# Patient Record
Sex: FEMALE | Race: WHITE | URBAN - METROPOLITAN AREA
[De-identification: names, ages, dates, MRNs, and addresses within clinical notes are randomized per-mention and may not be internally consistent; named-entity substitution may affect disease eponyms.]

---

## 2018-05-18 ENCOUNTER — ANESTHESIA (OUTPATIENT)
Dept: SURGERY | Facility: CLINIC | Age: 38
End: 2018-05-18
Payer: COMMERCIAL

## 2018-05-18 ENCOUNTER — ANESTHESIA EVENT (OUTPATIENT)
Dept: SURGERY | Facility: CLINIC | Age: 38
End: 2018-05-18
Payer: COMMERCIAL

## 2018-05-18 ENCOUNTER — APPOINTMENT (OUTPATIENT)
Dept: GENERAL RADIOLOGY | Facility: CLINIC | Age: 38
End: 2018-05-18
Attending: ORTHOPAEDIC SURGERY
Payer: COMMERCIAL

## 2018-05-18 ENCOUNTER — HOSPITAL ENCOUNTER (OUTPATIENT)
Facility: CLINIC | Age: 38
Discharge: HOME | End: 2018-05-18
Attending: ORTHOPAEDIC SURGERY | Admitting: ORTHOPAEDIC SURGERY
Payer: COMMERCIAL

## 2018-05-18 VITALS
HEIGHT: 68 IN | RESPIRATION RATE: 16 BRPM | BODY MASS INDEX: 44.41 KG/M2 | TEMPERATURE: 99.2 F | WEIGHT: 293 LBS | SYSTOLIC BLOOD PRESSURE: 126 MMHG | OXYGEN SATURATION: 98 % | DIASTOLIC BLOOD PRESSURE: 75 MMHG

## 2018-05-18 DIAGNOSIS — M84.374A STRESS FRACTURE OF NAVICULAR BONE OF RIGHT FOOT: Primary | ICD-10-CM

## 2018-05-18 LAB — HCG UR QL: NEGATIVE

## 2018-05-18 PROCEDURE — 25000132 ZZH RX MED GY IP 250 OP 250 PS 637: Performed by: ORTHOPAEDIC SURGERY

## 2018-05-18 PROCEDURE — 25000128 H RX IP 250 OP 636: Performed by: PHYSICIAN ASSISTANT

## 2018-05-18 PROCEDURE — 27210794 ZZH OR GENERAL SUPPLY STERILE: Performed by: ORTHOPAEDIC SURGERY

## 2018-05-18 PROCEDURE — 27211024 ZZHC OR SUPPLY OTHER OPNP: Performed by: ORTHOPAEDIC SURGERY

## 2018-05-18 PROCEDURE — 71000027 ZZH RECOVERY PHASE 2 EACH 15 MINS: Performed by: ORTHOPAEDIC SURGERY

## 2018-05-18 PROCEDURE — 25000128 H RX IP 250 OP 636: Performed by: NURSE ANESTHETIST, CERTIFIED REGISTERED

## 2018-05-18 PROCEDURE — 25000566 ZZH SEVOFLURANE, EA 15 MIN: Performed by: ORTHOPAEDIC SURGERY

## 2018-05-18 PROCEDURE — 27210995 ZZH RX 272: Performed by: ORTHOPAEDIC SURGERY

## 2018-05-18 PROCEDURE — 37000009 ZZH ANESTHESIA TECHNICAL FEE, EACH ADDTL 15 MIN: Performed by: ORTHOPAEDIC SURGERY

## 2018-05-18 PROCEDURE — 36000060 ZZH SURGERY LEVEL 3 W FLUORO 1ST 30 MIN: Performed by: ORTHOPAEDIC SURGERY

## 2018-05-18 PROCEDURE — 25000128 H RX IP 250 OP 636: Performed by: ANESTHESIOLOGY

## 2018-05-18 PROCEDURE — 40000306 ZZH STATISTIC PRE PROC ASSESS II: Performed by: ORTHOPAEDIC SURGERY

## 2018-05-18 PROCEDURE — 25000125 ZZHC RX 250: Performed by: NURSE ANESTHETIST, CERTIFIED REGISTERED

## 2018-05-18 PROCEDURE — C1713 ANCHOR/SCREW BN/BN,TIS/BN: HCPCS | Performed by: ORTHOPAEDIC SURGERY

## 2018-05-18 PROCEDURE — C1762 CONN TISS, HUMAN(INC FASCIA): HCPCS | Performed by: ORTHOPAEDIC SURGERY

## 2018-05-18 PROCEDURE — 71000012 ZZH RECOVERY PHASE 1 LEVEL 1 FIRST HR: Performed by: ORTHOPAEDIC SURGERY

## 2018-05-18 PROCEDURE — 93010 ELECTROCARDIOGRAM REPORT: CPT | Performed by: INTERNAL MEDICINE

## 2018-05-18 PROCEDURE — 40000277 XR SURGERY CARM FLUORO LESS THAN 5 MIN W STILLS

## 2018-05-18 PROCEDURE — 71000013 ZZH RECOVERY PHASE 1 LEVEL 1 EA ADDTL HR: Performed by: ORTHOPAEDIC SURGERY

## 2018-05-18 PROCEDURE — 36000058 ZZH SURGERY LEVEL 3 EA 15 ADDTL MIN: Performed by: ORTHOPAEDIC SURGERY

## 2018-05-18 PROCEDURE — 37000008 ZZH ANESTHESIA TECHNICAL FEE, 1ST 30 MIN: Performed by: ORTHOPAEDIC SURGERY

## 2018-05-18 PROCEDURE — 81025 URINE PREGNANCY TEST: CPT | Performed by: ANESTHESIOLOGY

## 2018-05-18 DEVICE — GRAFT BONE PUTTY DBX 01ML 038010: Type: IMPLANTABLE DEVICE | Site: FOOT | Status: FUNCTIONAL

## 2018-05-18 RX ORDER — GABAPENTIN 300 MG/1
300 CAPSULE ORAL 3 TIMES DAILY
Qty: 9 CAPSULE | Refills: 0 | Status: SHIPPED | OUTPATIENT
Start: 2018-05-18 | End: 2018-05-21

## 2018-05-18 RX ORDER — HYDROXYZINE HYDROCHLORIDE 25 MG/1
25 TABLET, FILM COATED ORAL
Status: DISCONTINUED | OUTPATIENT
Start: 2018-05-18 | End: 2018-05-18 | Stop reason: HOSPADM

## 2018-05-18 RX ORDER — HYDROXYZINE HYDROCHLORIDE 25 MG/1
25 TABLET, FILM COATED ORAL EVERY 6 HOURS PRN
Qty: 30 TABLET | Refills: 0 | Status: SHIPPED | OUTPATIENT
Start: 2018-05-18

## 2018-05-18 RX ORDER — IBUPROFEN 600 MG/1
600 TABLET, FILM COATED ORAL EVERY 6 HOURS
Qty: 40 TABLET | Refills: 0 | Status: SHIPPED | OUTPATIENT
Start: 2018-05-18

## 2018-05-18 RX ORDER — CEFAZOLIN SODIUM 1 G/50ML
3 SOLUTION INTRAVENOUS
Status: COMPLETED | OUTPATIENT
Start: 2018-05-18 | End: 2018-05-18

## 2018-05-18 RX ORDER — ONDANSETRON 2 MG/ML
INJECTION INTRAMUSCULAR; INTRAVENOUS PRN
Status: DISCONTINUED | OUTPATIENT
Start: 2018-05-18 | End: 2018-05-18

## 2018-05-18 RX ORDER — OXYCODONE HYDROCHLORIDE 5 MG/1
5-10 TABLET ORAL
Qty: 12 TABLET | Refills: 0 | Status: SHIPPED | OUTPATIENT
Start: 2018-05-18

## 2018-05-18 RX ORDER — DEXAMETHASONE SODIUM PHOSPHATE 4 MG/ML
INJECTION, SOLUTION INTRA-ARTICULAR; INTRALESIONAL; INTRAMUSCULAR; INTRAVENOUS; SOFT TISSUE PRN
Status: DISCONTINUED | OUTPATIENT
Start: 2018-05-18 | End: 2018-05-18

## 2018-05-18 RX ORDER — OXYCODONE HYDROCHLORIDE 5 MG/1
10 TABLET ORAL
Status: COMPLETED | OUTPATIENT
Start: 2018-05-18 | End: 2018-05-18

## 2018-05-18 RX ORDER — PROPOFOL 10 MG/ML
INJECTION, EMULSION INTRAVENOUS PRN
Status: DISCONTINUED | OUTPATIENT
Start: 2018-05-18 | End: 2018-05-18

## 2018-05-18 RX ORDER — MAGNESIUM HYDROXIDE 1200 MG/15ML
LIQUID ORAL PRN
Status: DISCONTINUED | OUTPATIENT
Start: 2018-05-18 | End: 2018-05-18 | Stop reason: HOSPADM

## 2018-05-18 RX ORDER — LIDOCAINE HYDROCHLORIDE 10 MG/ML
INJECTION, SOLUTION INFILTRATION; PERINEURAL PRN
Status: DISCONTINUED | OUTPATIENT
Start: 2018-05-18 | End: 2018-05-18

## 2018-05-18 RX ORDER — CEFAZOLIN SODIUM 1 G/3ML
1 INJECTION, POWDER, FOR SOLUTION INTRAMUSCULAR; INTRAVENOUS SEE ADMIN INSTRUCTIONS
Status: DISCONTINUED | OUTPATIENT
Start: 2018-05-18 | End: 2018-05-18 | Stop reason: HOSPADM

## 2018-05-18 RX ORDER — IBUPROFEN 600 MG/1
600 TABLET, FILM COATED ORAL
Status: COMPLETED | OUTPATIENT
Start: 2018-05-18 | End: 2018-05-18

## 2018-05-18 RX ORDER — ACETAMINOPHEN 325 MG/1
650 TABLET ORAL EVERY 6 HOURS
Qty: 50 TABLET | Refills: 0 | Status: SHIPPED | OUTPATIENT
Start: 2018-05-18

## 2018-05-18 RX ORDER — FENTANYL CITRATE 50 UG/ML
INJECTION, SOLUTION INTRAMUSCULAR; INTRAVENOUS PRN
Status: DISCONTINUED | OUTPATIENT
Start: 2018-05-18 | End: 2018-05-18

## 2018-05-18 RX ORDER — SODIUM CHLORIDE, SODIUM LACTATE, POTASSIUM CHLORIDE, CALCIUM CHLORIDE 600; 310; 30; 20 MG/100ML; MG/100ML; MG/100ML; MG/100ML
INJECTION, SOLUTION INTRAVENOUS CONTINUOUS
Status: DISCONTINUED | OUTPATIENT
Start: 2018-05-18 | End: 2018-05-18 | Stop reason: HOSPADM

## 2018-05-18 RX ADMIN — ROCURONIUM BROMIDE 50 MG: 10 INJECTION INTRAVENOUS at 13:32

## 2018-05-18 RX ADMIN — OXYCODONE HYDROCHLORIDE 10 MG: 5 TABLET ORAL at 16:06

## 2018-05-18 RX ADMIN — HYDROMORPHONE HYDROCHLORIDE 0.5 MG: 1 INJECTION, SOLUTION INTRAMUSCULAR; INTRAVENOUS; SUBCUTANEOUS at 14:12

## 2018-05-18 RX ADMIN — SODIUM CHLORIDE, POTASSIUM CHLORIDE, SODIUM LACTATE AND CALCIUM CHLORIDE: 600; 310; 30; 20 INJECTION, SOLUTION INTRAVENOUS at 14:06

## 2018-05-18 RX ADMIN — Medication 3 G: at 13:26

## 2018-05-18 RX ADMIN — FENTANYL CITRATE 100 MCG: 50 INJECTION, SOLUTION INTRAMUSCULAR; INTRAVENOUS at 12:41

## 2018-05-18 RX ADMIN — ONDANSETRON 4 MG: 2 INJECTION INTRAMUSCULAR; INTRAVENOUS at 15:00

## 2018-05-18 RX ADMIN — SODIUM CHLORIDE, POTASSIUM CHLORIDE, SODIUM LACTATE AND CALCIUM CHLORIDE: 600; 310; 30; 20 INJECTION, SOLUTION INTRAVENOUS at 13:26

## 2018-05-18 RX ADMIN — MIDAZOLAM 2 MG: 1 INJECTION INTRAMUSCULAR; INTRAVENOUS at 13:26

## 2018-05-18 RX ADMIN — FENTANYL CITRATE 100 MCG: 50 INJECTION, SOLUTION INTRAMUSCULAR; INTRAVENOUS at 13:32

## 2018-05-18 RX ADMIN — MIDAZOLAM 2 MG: 1 INJECTION INTRAMUSCULAR; INTRAVENOUS at 12:41

## 2018-05-18 RX ADMIN — DEXAMETHASONE SODIUM PHOSPHATE 4 MG: 4 INJECTION, SOLUTION INTRA-ARTICULAR; INTRALESIONAL; INTRAMUSCULAR; INTRAVENOUS; SOFT TISSUE at 13:32

## 2018-05-18 RX ADMIN — IBUPROFEN 600 MG: 600 TABLET ORAL at 16:04

## 2018-05-18 RX ADMIN — PROPOFOL 200 MG: 10 INJECTION, EMULSION INTRAVENOUS at 13:32

## 2018-05-18 RX ADMIN — LIDOCAINE HYDROCHLORIDE 50 MG: 10 INJECTION, SOLUTION INFILTRATION; PERINEURAL at 13:32

## 2018-05-18 NOTE — DISCHARGE INSTRUCTIONS
You received Toradol, an IV form of ibuprofen (Motrin) at 4pm.  Do not take any ibuprofen products until 10pm  You had 10 mg oxycodone at 4pm today.  Caring For Your Cast     This information was prepared for you to help you take care of your cast. Please read each section carefully and ask your physician if you have questions.    HOW TO CARE FOR YOUR CAST    If your cast is made of plaster of Vanna and it becomes soiled, clean using a damp cloth with dry cleanser or use white shoe polish sparingly. If your cast is made of fiberglass, clean with a damp cloth with a small amount of mild soap.    Avoid bumping or knocking the cast against any hard object. Cover rough areas with tape.    Never trim or cut down the length of your cast. Please call your physician if the cast becomes loose, broken or cracked.    If skin under the cast begins to itch, do not use anything to scratch under the cast since it may break the skin and cause an infection. Parents should be alert to prevent children from sticking forks, sticks or other objects inside the cast.    If your physician orders a cast boot, be sure to wear it.    You may wash areas around the cast, but do not saturate the cast in the process. Some coverings can be used to protect your cast. Please ask you physician to recommend one.  WHAT TO CHECK FOR    Check your fingers or toes for color changes, swelling, loss of motion, numbness, tingling or severe pain. In infants or young children, check for crying that cannot be soothed by usual methods. Call your physician if these symptoms occur.    If swelling is noted during the first 24 to 48 hours, elevate the extremity higher than your head. After this time, elevate it whenever you are in bed.    Check cast for undesirable odors or drainage. Also check for any areas of local pain under your cast. These may be signs of an area of pressure under the cast.    Be sure any padding used is well anchored to the cast. Be careful  not to pull padding out. Without the padding. Loose material may slip into cast and cause irritation.    WHEN TO CALL YOUR PHYSICIAN  You should call IF you notice:    A bluish color, increased swelling, loss of motion, increased numbness/tingling or severe pain of fingers or toes.    Unusually foul odor from cast.    Unusual drainage (blood is expected if there has been surgery).    Broken, cracked or very loose cast.    Localized pain under cast.  CAST REMOVAL AND POST CAST CARE    A specially designed saw will be used to remove your cast. Cast removal is fast and painless.    Use of skin lotion or bathing with bath oil may eliminate some of the dry, flaky skin which is present after your cast is removed. Do not scrub your skin since this may cause skin breakdown.     Elevate your extremity if you notice any swelling after your cast is removed.    Your arm or leg may appear thinner and lighter because you haven t been using it. Your physician will determine how much physical activity is advisable following removal of your cast.    REMEMBER: Cast care must continue as long as your cast is on.          GENERAL ANESTHESIA OR SEDATION ADULT DISCHARGE INSTRUCTIONS   SPECIAL PRECAUTIONS FOR 24 HOURS AFTER SURGERY    IT IS NOT UNUSUAL TO FEEL LIGHT-HEADED OR FAINT, UP TO 24 HOURS AFTER SURGERY OR WHILE TAKING PAIN MEDICATION.  IF YOU HAVE THESE SYMPTOMS; SIT FOR A FEW MINUTES BEFORE STANDING AND HAVE SOMEONE ASSIST YOU WHEN YOU GET UP TO WALK OR USE THE BATHROOM.    YOU SHOULD REST AND RELAX FOR THE NEXT 24 HOURS AND YOU MUST MAKE ARRANGEMENTS TO HAVE SOMEONE STAY WITH YOU FOR AT LEAST 24 HOURS AFTER YOUR DISCHARGE.  AVOID HAZARDOUS AND STRENUOUS ACTIVITIES.  DO NOT MAKE IMPORTANT DECISIONS FOR 24 HOURS.    DO NOT DRIVE ANY VEHICLE OR OPERATE MECHANICAL EQUIPMENT FOR 24 HOURS FOLLOWING THE END OF YOUR SURGERY.  EVEN THOUGH YOU MAY FEEL NORMAL, YOUR REACTIONS MAY BE AFFECTED BY THE MEDICATION YOU HAVE RECEIVED.    DO NOT  DRINK ALCOHOLIC BEVERAGES FOR 24 HOURS FOLLOWING YOUR SURGERY.    DRINK CLEAR LIQUIDS (APPLE JUICE, GINGER ALE, 7-UP, BROTH, ETC.).  PROGRESS TO YOUR REGULAR DIET AS YOU FEEL ABLE.    YOU MAY HAVE A DRY MOUTH, A SORE THROAT, MUSCLES ACHES OR TROUBLE SLEEPING.  THESE SHOULD GO AWAY AFTER 24 HOURS.    CALL YOUR DOCTOR FOR ANY OF THE FOLLOWING:  SIGNS OF INFECTION (FEVER, GROWING TENDERNESS AT THE SURGERY SITE, A LARGE AMOUNT OF DRAINAGE OR BLEEDING, SEVERE PAIN, FOUL-SMELLING DRAINAGE, REDNESS OR SWELLING.    IT HAS BEEN OVER 8 TO 10 HOURS SINCE SURGERY AND YOU ARE STILL NOT ABLE TO URINATE (PASS WATER).       HOME CARE INSTRUCTIONS AFTER REGIONAL ANESTHESIA      To do your surgical procedure, your doctor used regional anesthesia to  numb  your arm, leg, or foot. This type of anesthesia will not be completely worn off for 4-24 hours. You should be careful during this time not to injure your extremity.    As the anesthetic wears off, you may have a tingling and prickly sensation as the feeling starts to return. The amount of discomfort you may feel is unpredictable. Use your pain medication as prescribed or advised by your doctor.    1. Wear a sling until your arm is completely  awake .    2. Use crutches until your leg is  awake .    3. Avoid striking or bumping your arm, leg, or foot while it is numb.    4. Avoid extremes of hot or cold while it is numb.    5. Remain quiet and restful the day of surgery. Resume normal activities gradually over the next day or so as advised by your doctor.    6. Do not drive or operate any machinery until your extremity is fully  awake .        Please notify your doctor of any of the followin. There is excessive bleeding or drainage.  2. There is increased swelling of the extremity making the dressing too tight, and this is not relieved by elevating extremity.  3. There is blue coloring to fingers/toes or they are cold to touch.  4. There is severe pain not relieved by your  pain medication.  5. There is any numbness or tingling of the extremity the following day.

## 2018-05-18 NOTE — IP AVS SNAPSHOT
MRN:[id number]                      After Visit Summary   5/18/2018    Pina Gonzalez    MRN: [id number]           Thank you!     Thank you for choosing [facility] for your care. Our goal is always to provide you with excellent care. Hearing back from our patients is one way we can continue to improve our services. Please take a few minutes to complete the written survey that you may receive in the mail after you visit. If you would like to speak to someone directly about your visit please contact Patient Relations at [phone]. Thank you!          Patient Information     Date Of Birth          1980        About your hospital stay     You were admitted on:  May 18, 2018 You last received care in the:  [facility] PreOP/PostOP    You were discharged on:  May 18, 2018       Who to Call     For medical emergencies, please call 911.  For non-urgent questions about your medical care, please call your primary care provider or clinic, None  For questions related to your surgery, please call your surgery clinic        Attending Provider     Provider Specialty    Kee Mcfarlane MD Orthopaedic Surgery       Primary Care Provider Fax #    Physician No Ref-Primary [phone]      After Care Instructions     Discharge Instructions       Review discharge instructions as directed by Provider.            Discharge Instructions       Patient to arrange for return to clinic appointment in two weeks.            Elevate affected extremity       Elevate right leg at heart level 95% of the next 48 hours, then as needed for swelling            Ice to affected area       Ice pack to affected area PRN (as needed).            No Alcohol       for 24 hours post-procedure            No dressing change       until follow up clinic appointment.            No driving or operating machinery       Until able to bear weight            No weight bearing                 Further instructions from  your care team       You received Toradol, an IV form of ibuprofen (Motrin) at 4pm.  Do not take any ibuprofen products until 10pm  You had 10 mg oxycodone at 4pm today.  Caring For Your Cast     This information was prepared for you to help you take care of your cast. Please read each section carefully and ask your physician if you have questions.    HOW TO CARE FOR YOUR CAST    If your cast is made of plaster of Vanna and it becomes soiled, clean using a damp cloth with dry cleanser or use white shoe polish sparingly. If your cast is made of fiberglass, clean with a damp cloth with a small amount of mild soap.    Avoid bumping or knocking the cast against any hard object. Cover rough areas with tape.    Never trim or cut down the length of your cast. Please call your physician if the cast becomes loose, broken or cracked.    If skin under the cast begins to itch, do not use anything to scratch under the cast since it may break the skin and cause an infection. Parents should be alert to prevent children from sticking forks, sticks or other objects inside the cast.    If your physician orders a cast boot, be sure to wear it.    You may wash areas around the cast, but do not saturate the cast in the process. Some coverings can be used to protect your cast. Please ask you physician to recommend one.  WHAT TO CHECK FOR    Check your fingers or toes for color changes, swelling, loss of motion, numbness, tingling or severe pain. In infants or young children, check for crying that cannot be soothed by usual methods. Call your physician if these symptoms occur.    If swelling is noted during the first 24 to 48 hours, elevate the extremity higher than your head. After this time, elevate it whenever you are in bed.    Check cast for undesirable odors or drainage. Also check for any areas of local pain under your cast. These may be signs of an area of pressure under the cast.    Be sure any padding used is well anchored to  the cast. Be careful not to pull padding out. Without the padding. Loose material may slip into cast and cause irritation.    WHEN TO CALL YOUR PHYSICIAN  You should call IF you notice:    A bluish color, increased swelling, loss of motion, increased numbness/tingling or severe pain of fingers or toes.    Unusually foul odor from cast.    Unusual drainage (blood is expected if there has been surgery).    Broken, cracked or very loose cast.    Localized pain under cast.  CAST REMOVAL AND POST CAST CARE    A specially designed saw will be used to remove your cast. Cast removal is fast and painless.    Use of skin lotion or bathing with bath oil may eliminate some of the dry, flaky skin which is present after your cast is removed. Do not scrub your skin since this may cause skin breakdown.     Elevate your extremity if you notice any swelling after your cast is removed.    Your arm or leg may appear thinner and lighter because you haven t been using it. Your physician will determine how much physical activity is advisable following removal of your cast.    REMEMBER: Cast care must continue as long as your cast is on.          GENERAL ANESTHESIA OR SEDATION ADULT DISCHARGE INSTRUCTIONS   SPECIAL PRECAUTIONS FOR 24 HOURS AFTER SURGERY    IT IS NOT UNUSUAL TO FEEL LIGHT-HEADED OR FAINT, UP TO 24 HOURS AFTER SURGERY OR WHILE TAKING PAIN MEDICATION.  IF YOU HAVE THESE SYMPTOMS; SIT FOR A FEW MINUTES BEFORE STANDING AND HAVE SOMEONE ASSIST YOU WHEN YOU GET UP TO WALK OR USE THE BATHROOM.    YOU SHOULD REST AND RELAX FOR THE NEXT 24 HOURS AND YOU MUST MAKE ARRANGEMENTS TO HAVE SOMEONE STAY WITH YOU FOR AT LEAST 24 HOURS AFTER YOUR DISCHARGE.  AVOID HAZARDOUS AND STRENUOUS ACTIVITIES.  DO NOT MAKE IMPORTANT DECISIONS FOR 24 HOURS.    DO NOT DRIVE ANY VEHICLE OR OPERATE MECHANICAL EQUIPMENT FOR 24 HOURS FOLLOWING THE END OF YOUR SURGERY.  EVEN THOUGH YOU MAY FEEL NORMAL, YOUR REACTIONS MAY BE AFFECTED BY THE MEDICATION YOU HAVE  RECEIVED.    DO NOT DRINK ALCOHOLIC BEVERAGES FOR 24 HOURS FOLLOWING YOUR SURGERY.    DRINK CLEAR LIQUIDS (APPLE JUICE, GINGER ALE, 7-UP, BROTH, ETC.).  PROGRESS TO YOUR REGULAR DIET AS YOU FEEL ABLE.    YOU MAY HAVE A DRY MOUTH, A SORE THROAT, MUSCLES ACHES OR TROUBLE SLEEPING.  THESE SHOULD GO AWAY AFTER 24 HOURS.    CALL YOUR DOCTOR FOR ANY OF THE FOLLOWING:  SIGNS OF INFECTION (FEVER, GROWING TENDERNESS AT THE SURGERY SITE, A LARGE AMOUNT OF DRAINAGE OR BLEEDING, SEVERE PAIN, FOUL-SMELLING DRAINAGE, REDNESS OR SWELLING.    IT HAS BEEN OVER 8 TO 10 HOURS SINCE SURGERY AND YOU ARE STILL NOT ABLE TO URINATE (PASS WATER).       HOME CARE INSTRUCTIONS AFTER REGIONAL ANESTHESIA      To do your surgical procedure, your doctor used regional anesthesia to  numb  your arm, leg, or foot. This type of anesthesia will not be completely worn off for 4-24 hours. You should be careful during this time not to injure your extremity.    As the anesthetic wears off, you may have a tingling and prickly sensation as the feeling starts to return. The amount of discomfort you may feel is unpredictable. Use your pain medication as prescribed or advised by your doctor.    1. Wear a sling until your arm is completely  awake .    2. Use crutches until your leg is  awake .    3. Avoid striking or bumping your arm, leg, or foot while it is numb.    4. Avoid extremes of hot or cold while it is numb.    5. Remain quiet and restful the day of surgery. Resume normal activities gradually over the next day or so as advised by your doctor.    6. Do not drive or operate any machinery until your extremity is fully  awake .        Please notify your doctor of any of the followin. There is excessive bleeding or drainage.  2. There is increased swelling of the extremity making the dressing too tight, and this is not relieved by elevating extremity.  3. There is blue coloring to fingers/toes or they are cold to touch.  4. There is severe pain not  "relieved by your pain medication.  5. There is any numbness or tingling of the extremity the following day.      Pending Results     No orders found from 2018 to 2018.            Admission Information     Date & Time Provider Department Dept. Phone    2018 Kee Mcfarlane MD [facility] [place] [facility] PreOP/PostOP [phone]      Your Vitals Were     Blood Pressure Temperature Respirations Height Weight Pulse Oximetry    143/72 97.7  F (36.5  C) 15 1.727 m (5' 8\") 135.1 kg (297 lb 12.8 oz) 95%    BMI (Body Mass Index)                   45.28 kg/m2           MyChart Information     [employer] lets you send messages to your doctor, view your test results, renew your prescriptions, schedule appointments and more. To sign up, go to [email or web address] . Click on \"Log in\" on the left side of the screen, which will take you to the Welcome page. Then click on \"Sign up Now\" on the right side of the page.     You will be asked to enter the access code listed below, as well as some personal information. Please follow the directions to create your username and password.     Your access code is: [email or web address]  Expires: 2018  3:44 PM     Your access code will  in 90 days. If you need help or a new code, please call your [place] clinic or [phone].        Care EveryWhere ID     This is your Care EveryWhere ID. This could be used by other organizations to access your [place] medical records  [id number]        Equal Access to Services     [facility] : Hadii jaswant noble Socynthia, waaxda luqadaha, qaybta kaalmaiona shabazz . So [facility] [phone].    ATENCIÓN: Si habla español, tiene a lama disposición servicios gratuitos de asistencia lingüística. Llame al [phone].    We comply with applicable federal civil rights laws and [place] laws. We do not discriminate on the basis of race, color, national origin, age, disability, sex, sexual orientation, or " gender identity.               Review of your medicines      START taking        Dose / Directions    acetaminophen 325 MG tablet   Commonly known as:  TYLENOL   Used for:  Stress fracture of navicular bone of right foot        Dose:  650 mg   Take 2 tablets (650 mg) by mouth every 6 hours Take 2 tablets every 6 hours scheduled for 48 hours, then 1-2 tablet every 6 hours as needed for pain.  Alternate with Ibuprofen   Quantity:  50 tablet   Refills:  0       gabapentin 300 MG capsule   Commonly known as:  NEURONTIN   Used for:  Stress fracture of navicular bone of right foot        Dose:  300 mg   Take 1 capsule (300 mg) by mouth 3 times daily for 3 days Take two capsules by mouth at bedtime first night, then increase to three capsules by mouth at bedtime second night and following nights   Quantity:  9 capsule   Refills:  0       hydrOXYzine 25 MG tablet   Commonly known as:  ATARAX   Used for:  Stress fracture of navicular bone of right foot        Dose:  25 mg   Take 1 tablet (25 mg) by mouth every 6 hours as needed for itching (and nausea)   Quantity:  30 tablet   Refills:  0       oxyCODONE IR 5 MG tablet   Commonly known as:  ROXICODONE   Used for:  Stress fracture of navicular bone of right foot        Dose:  5-10 mg   Take 1-2 tablets (5-10 mg) by mouth every 3 hours as needed for pain or other (Moderate to Severe)   Quantity:  12 tablet   Refills:  0         CONTINUE these medicines which may have CHANGED, or have new prescriptions. If we are uncertain of the size of tablets/capsules you have at home, strength may be listed as something that might have changed.        Dose / Directions    ibuprofen 600 MG tablet   Commonly known as:  ADVIL/MOTRIN   This may have changed:    - medication strength  - how much to take  - when to take this  - reasons to take this  - additional instructions   Used for:  Stress fracture of navicular bone of right foot        Dose:  600 mg   Take 1 tablet (600 mg) by mouth every 6  hours Take 1 tablet every 6 hours scheduled for 48 hours, then 1 tablet every 6 hours as needed for pain.  Alternate with Tylenol   Quantity:  40 tablet   Refills:  0            Where to get your medicines      These medications were sent to [place], [place] - [address] [facility]  [address] [facility] [address]     Phone:  [phone]     hydrOXYzine 25 MG tablet         Some of these will need a paper prescription and others can be bought over the counter. Ask your nurse if you have questions.     Bring a paper prescription for each of these medications     acetaminophen 325 MG tablet    gabapentin 300 MG capsule    ibuprofen 600 MG tablet    oxyCODONE IR 5 MG tablet                Protect others around you: Learn how to safely use, store and throw away your medicines at www.disposemymeds.org.        Information about OPIOIDS     PRESCRIPTION OPIOIDS: WHAT YOU NEED TO KNOW   You have a prescription for an opioid (narcotic) pain medicine. Opioids can cause addiction. If you have a history of chemical dependency of any type, you are at a higher risk of becoming addicted to opioids. Only take this medicine after all other options have been tried. Take it for as short a time and as few doses as possible.     Do not:    Drive. If you drive while taking these medicines, you could be arrested for driving under the influence (DUI).    Operate heavy machinery    Do any other dangerous activities while taking these medicines.     Drink any alcohol while taking these medicines.      Take with any other medicines that contain acetaminophen. Read all labels carefully. Look for the word  acetaminophen  or  Tylenol.  Ask your pharmacist if you have questions or are unsure.    Store your pills in a secure place, locked if possible. We will not replace any lost or stolen medicine. If you don t finish your medicine, please throw away (dispose) as directed by your pharmacist. The  [place] Pollution Control Agency has more information about safe disposal: https://www.pca.[facility].[place].us/living-green/managing-unwanted-medications    All opioids tend to cause constipation. Drink plenty of water and eat foods that have a lot of fiber, such as fruits, vegetables, prune juice, apple juice and high-fiber cereal. Take a laxative (Miralax, milk of magnesia, Colace, Senna) if you don t move your bowels at least every other day.              Medication List: This is a list of all your medications and when to take them. Check marks below indicate your daily home schedule. Keep this list as a reference.      Medications           Morning Afternoon Evening Bedtime As Needed    acetaminophen 325 MG tablet   Commonly known as:  TYLENOL   Take 2 tablets (650 mg) by mouth every 6 hours Take 2 tablets every 6 hours scheduled for 48 hours, then 1-2 tablet every 6 hours as needed for pain.  Alternate with Ibuprofen                                gabapentin 300 MG capsule   Commonly known as:  NEURONTIN   Take 1 capsule (300 mg) by mouth 3 times daily for 3 days Take two capsules by mouth at bedtime first night, then increase to three capsules by mouth at bedtime second night and following nights                                hydrOXYzine 25 MG tablet   Commonly known as:  ATARAX   Take 1 tablet (25 mg) by mouth every 6 hours as needed for itching (and nausea)                                ibuprofen 600 MG tablet   Commonly known as:  ADVIL/MOTRIN   Take 1 tablet (600 mg) by mouth every 6 hours Take 1 tablet every 6 hours scheduled for 48 hours, then 1 tablet every 6 hours as needed for pain.  Alternate with Tylenol   Last time this was given:  600 mg on 5/18/2018  4:04 PM                                oxyCODONE IR 5 MG tablet   Commonly known as:  ROXICODONE   Take 1-2 tablets (5-10 mg) by mouth every 3 hours as needed for pain or other (Moderate to Severe)   Last time this was given:  10 mg on 5/18/2018  4:06 PM

## 2018-05-18 NOTE — ANESTHESIA PROCEDURE NOTES
Peripheral nerve/Neuraxial procedure note : Sciatic  Pre-Procedure  Performed by MLEVA MIRANDA  Referred by KEN  Location: pre-op    Procedure Times:5/18/2018 12:41 PM and 5/18/2018 12:47 PM  Pre-Anesthestic Checklist: patient identified, IV checked, site marked, risks and benefits discussed, informed consent, monitors and equipment checked, pre-op evaluation, at physician/surgeon's request and post-op pain management    Timeout  Correct Patient: Yes   Correct Procedure: Yes   Correct Site: Yes   Correct Laterality: Yes   Correct Position: Yes   Site Marked: Yes   .   Procedure Documentation    Diagnosis:RIGHT FOOT ARTHRITIS.    Procedure:  right  Sciatic.     Ultrasound used to identify targeted nerve, plexus, or vascular marker and placed a needle adjacent to it., Ultrasound was used to visualize the spread of the anesthetic in close proximity to the above stated nerve.   Patient Prep;mask, sterile gloves, povidone-iodine 7.5% surgical scrub.  .  Needle: insulated, short bevel Needle Gauge: 21.    Needle Length (Inches) 4  Insertion Method: Single Shot.       Assessment/Narrative  Paresthesias: No.  Injection made incrementally with aspirations every 5 mL..  The placement was negative for: blood aspirated, painful injection and site bleeding.  Bolus given via needle..   Secured via.   Complications: none. Comments:  The surgeon has given a verbal order transferring care of this patient to me for the performance of a regional analgesia block for post-op pain control. It is requested of me because I am uniquely trained and qualified to perform this block and the surgeon is neither trained nor qualified to perform this procedure.    30 ml 0.5% bupivacaine with 1:200k epi

## 2018-05-18 NOTE — ANESTHESIA PROCEDURE NOTES
Peripheral nerve/Neuraxial procedure note : Saphenous  Pre-Procedure  Performed by MELVA MIRANDA  Referred by KEN  Location: pre-op    Procedure Times:5/18/2018 12:46 PM and 5/18/2018 12:51 PM  Pre-Anesthestic Checklist: patient identified, IV checked, site marked, risks and benefits discussed, informed consent, monitors and equipment checked, pre-op evaluation, at physician/surgeon's request and post-op pain management    Timeout  Correct Patient: Yes   Correct Procedure: Yes   Correct Site: Yes   Correct Laterality: Yes   Correct Position: Yes   Site Marked: Yes   .   Procedure Documentation    Diagnosis:RIGHT FOOT ARTHRITIS.    Procedure:  right  Saphenous.     Ultrasound used to identify targeted nerve, plexus, or vascular marker and placed a needle adjacent to it., Ultrasound was used to visualize the spread of the anesthetic in close proximity to the above stated nerve.   Patient Prep;mask, sterile gloves, povidone-iodine 7.5% surgical scrub.  .  Needle: insulated Needle Gauge: 21.  Insertion Method: Single Shot.       Assessment/Narrative  Paresthesias: No.  Injection made incrementally with aspirations every 5 mL..  The placement was negative for: blood aspirated, painful injection and site bleeding.  Bolus given via needle..   Secured via.   Complications: none. Comments:  The surgeon has given a verbal order transferring care of this patient to me for the performance of a regional analgesia block for post-op pain control. It is requested of me because I am uniquely trained and qualified to perform this block and the surgeon is neither trained nor qualified to perform this procedure.    10 ml 0.5% bupivacaine with 1:200k epi

## 2018-05-18 NOTE — BRIEF OP NOTE
[facility] Brief Operative Note    Pre-operative diagnosis: navicular stress fracture, right   Post-operative diagnosis same   Procedure: 1) [place] iliac crest bone marrow for BMAC  2) Bone grafting and fixation navicular stress fracture, right   Surgeon(s): Surgeon(s) and Role:     * Kee Mcfarlane MD - Primary     * Maira Sheppard PA-C - Assisting   Estimated blood loss: minimal   Specimens: none   Findings: ANES: gen + reg  IMPLANTS: [id number] 2.5 mm

## 2018-05-18 NOTE — ANESTHESIA PREPROCEDURE EVALUATION
Anesthesia Evaluation     . Pt has had prior anesthetic. Type: General    No history of anesthetic complications          ROS/MED HX    ENT/Pulmonary:  - neg pulmonary ROS     Neurologic:       Cardiovascular:  - neg cardiovascular ROS       METS/Exercise Tolerance:     Hematologic:         Musculoskeletal:   (+) arthritis, , , -       GI/Hepatic:         Renal/Genitourinary:         Endo:     (+) Obesity, .      Psychiatric:         Infectious Disease:   (+) Other Infectious Disease dx'd with pyelonephritis 4/18/18      Malignancy:         Other:                     Physical Exam      Airway   Mallampati: II  TM distance: >3 FB  Neck ROM: full    Dental     Cardiovascular   Rhythm and rate: normal  (+) murmur       Pulmonary    breath sounds clear to auscultation                    Anesthesia Plan      History & Physical Review  History and physical reviewed and following examination; no interval change.    ASA Status:  3 .    NPO Status:  > 8 hours    Plan for General, LMA and Periph. Nerve Block for postop pain with Intravenous and Propofol induction. Maintenance will be Balanced.    PONV prophylaxis:  Ondansetron (or other 5HT-3) and Dexamethasone or Solumedrol       Postoperative Care  Postoperative pain management:  IV analgesics, Oral pain medications, Multi-modal analgesia and Peripheral nerve block (Single Shot).      Consents  Anesthetic plan, risks, benefits and alternatives discussed with:  Patient..                          .

## 2018-05-18 NOTE — IP AVS SNAPSHOT
[facility] PreOP/PostOP    [address]    [facility] [address]    Phone:  [phone]    Fax:  [phone]                                       After Visit Summary   5/18/2018    Pina Gonzalez    MRN: [id number]           After Visit Summary Signature Page     I have received my discharge instructions, and my questions have been answered. I have discussed any challenges I see with this plan with the nurse or doctor.    ..........................................................................................................................................  Patient/Patient Representative Signature      ..........................................................................................................................................  Patient Representative Print Name and Relationship to Patient    ..................................................               ................................................  Date                                            Time    ..........................................................................................................................................  Reviewed by Signature/Title    ...................................................              ..............................................  Date                                                            Time

## 2018-05-18 NOTE — ANESTHESIA CARE TRANSFER NOTE
Patient: Pina Gonzalez    Procedure(s):  Right foot bone grafting and fixation of navicular stress fracture, [place] iliac crest bone marrow - Wound Class: I-Clean      Diagnosis: navicular stress fracture  Diagnosis Additional Information: Pre-operative diagnosis: navicular stress fracture, right  Post-operative diagnosis same  Procedure: 1) [place] iliac crest bone marrow for BMAC  2) Bone grafting and fixation navicular stress fracture, right        Anesthesia Type:   General, LMA, Periph. Nerve Block for postop pain     Note:  Airway :Face Mask  Patient transferred to:PACU  Handoff Report: Identifed the Patient, Identified the Reponsible Provider, Reviewed the pertinent medical history, Discussed the surgical course, Reviewed Intra-OP anesthesia mangement and issues during anesthesia, Set expectations for post-procedure period and Allowed opportunity for questions and acknowledgement of understanding      Vitals: (Last set prior to Anesthesia Care Transfer)    CRNA VITALS  5/18/2018 1438 - 5/18/2018 1514      5/18/2018             Pulse: 99    SpO2: 99 %    Resp Rate (observed): 8                Electronically Signed By: EDGARDO Baires CRNA  May 18, 2018  3:14 PM

## 2018-05-18 NOTE — ANESTHESIA POSTPROCEDURE EVALUATION
Patient: Pina Gonzalez    Procedure(s):  Right foot bone grafting and fixation of navicular stress fracture, [place] iliac crest bone marrow - Wound Class: I-Clean      Diagnosis:navicular stress fracture  Diagnosis Additional Information: Pre-operative diagnosis: navicular stress fracture, right  Post-operative diagnosis same  Procedure: 1) [place] iliac crest bone marrow for BMAC  2) Bone grafting and fixation navicular stress fracture, right        Anesthesia Type:  General, LMA, Periph. Nerve Block for postop pain    Note:  Anesthesia Post Evaluation    Patient location during evaluation: PACU  Patient participation: Able to fully participate in evaluation  Level of consciousness: awake  Pain management: adequate  Airway patency: patent  Cardiovascular status: acceptable  Respiratory status: acceptable  Hydration status: acceptable  PONV: controlled     Anesthetic complications: None          Last vitals:  Vitals:    05/18/18 1240 05/18/18 1300 05/18/18 1510   BP: 142/76 140/84    Resp: 12 12    Temp:   97.7  F (36.5  C)   SpO2: 96% 99%          Electronically Signed By: Ramana Lindo MD  May 18, 2018  3:36 PM

## 2018-05-19 LAB — INTERPRETATION ECG - MUSE: NORMAL

## 2018-05-20 NOTE — OP NOTE
Procedure Date: 05/18/2018      PREOPERATIVE DIAGNOSES:  Navicular stress fracture, right.      POSTOPERATIVE DIAGNOSIS:  Navicular stress fracture, right.      PROCEDURES:   1.  [place] iliac crest bone marrow for bone marrow aspirate concentrate.   2.  Bone grafting and fixation, navicular stress fracture, right.      IMPLANTS:     1.  [place] 28 (2.5 mm).   2.  Bone marrow aspirate concentrate.      SURGEON:  Kee Mcfarlane MD      ASSISTANT:  Portillo Sheppard PA-C      ANESTHESIA:  General plus regional.      ESTIMATED BLOOD LOSS:  Minimal.      DRAINS:  None.      SPECIMENS:  None.      COMPLICATIONS:  None.      INDICATIONS FOR PROCEDURE:  Pina Gonzalez is a 37-year-old woman who presented with dorsal mid foot pain.  A CT scan confirmed a navicular stress fracture.  This is an incomplete fracture.  After discussion of risks, benefits and alternatives, she elected to proceed with surgical treatment.      PROCEDURE IN DETAIL AND FINDINGS:  The patient was identified in the preprocedure area and appropriately marked.  She underwent a regional anesthetic by the anesthesia team.  She was brought to the operating room where general anesthetic was administered and airway secured without difficulty.  Preoperative antibiotic prophylaxis was provided within an hour of the incision.  Prophylaxis was discontinued the day of surgery.  A tourniquet was placed on the right calf.      After a pause for the cause was performed, I prepped and draped the right iliac crest.  Bone marrow aspirate needle was inserted and 30 mL of bone marrow aspirate removed.  This was handed off for concentration.  A single stitch was placed in the right iliac crest and this was covered with a Band-Aid.      The right leg and foot were then prepped and draped in sterile fashion.  Limb was elevated for exsanguination and the tourniquet inflated.  A pause for the cause was performed.  I used the C-arm to identify the middle third/lateral third  junction of the navicular and then planned my incision.  The overlying neurovascular bundle was mobilized mostly lateral, but was moved to both medially and laterally during the case to achieve certain areas of visualization.      Navicular was exposed and identified the soft area of the stress fracture.  The surrounding bone was quite sclerotic and hard.  I debrided the softer area away and placed a combination of the bone marrow aspirate concentrate and DBX into this area.  I then shaped a [place] plate in place with the navicular.  The first screw was placed.  I felt it was slightly too close to the joint surface for my liking and therefore I planned to move the plate more distally.  Unfortunately, the head of the screw that was placed broke and despite 2 other attempts, the screw continued to break.  At some point, the screw was buried deeply enough that I felt there was more concern for further damage to the bone than there was advantage to removing the screw, therefore this was left in place.  I positioned the plate more distally and secured it with 4 screws.      AP, lateral and oblique views of the right foot were taken and reviewed.  Alignment was good.  Instrumentation was stable.      The wound was irrigated and closed in layers.  Adaptic, sterile dressings and a well-padded short-leg cast were applied.      The patient tolerated the procedure well.  There were no complications.  All sponge and needle counts were correct.      PLAN:  She will be nonweightbearing in her cast.  She will return in 2 weeks for cast off, wound check, sutures out and placement of an Aircast boot.  She can then perform gentle range of motion.  She should, however, remain nonweightbearing.        She will return at 6 weeks for AP, lateral and oblique views of the right foot and reexamination.         ANGELA ROGERS MD             D: 05/20/2018   T: 05/20/2018   MT: [id number]      Name:     SIDNEY LOPEZ   MRN:       [id number]        Account:        [id number]   :      1980           Procedure Date: 2018      Document: [id number]

## (undated) DEVICE — BAG CLEAR TRASH 1.3M 39X33" P4040C

## (undated) DEVICE — LINEN FULL SHEET 5511

## (undated) DEVICE — BNDG ELASTIC 6" DBL LENGTH UNSTERILE 6611-16

## (undated) DEVICE — GLOVE PROTEXIS POWDER FREE 8.0 ORTHOPEDIC 2D73ET80

## (undated) DEVICE — CAST PADDING 4" STERILE 9044S

## (undated) DEVICE — PACK LOWER EXTREMITY RIDGES

## (undated) DEVICE — ESU GROUND PAD ADULT W/CORD E7507

## (undated) DEVICE — GLOVE PROTEXIS MICRO 8.0  2D73PM80

## (undated) DEVICE — TUBING SUCTION 12"X1/4" N612

## (undated) DEVICE — BLADE SAW OSCIL/SAG STRK MICRO 5.5X25X0.38MM 2296-003-414

## (undated) DEVICE — Device

## (undated) DEVICE — SUCTION TIP YANKAUER W/O VENT K86

## (undated) DEVICE — LINEN TOWEL PACK X5 5464

## (undated) DEVICE — CAST PADDING 4" UNSTERILE 9044

## (undated) DEVICE — DRSG STERI STRIP 1/2X4" R1547

## (undated) DEVICE — SU VICRYL 3-0 CT-2 27" UND J232H

## (undated) DEVICE — PREP CHLORAPREP 26ML TINTED ORANGE  260815

## (undated) DEVICE — SU VICRYL 0 CT-2 27" UND J270H

## (undated) DEVICE — DRSG BANDAID 1X3" FABRIC LATEX FREE

## (undated) DEVICE — DRAPE IOBAN INCISE 23X17" 6650EZ

## (undated) DEVICE — LINEN HALF SHEET 5512

## (undated) DEVICE — SU ETHILON 3-0 PS-2 18" 1669H

## (undated) DEVICE — DECANTER VIAL 2006S

## (undated) DEVICE — PACK MINOR CUSTOM RIDGES SBA32RMRMA

## (undated) DEVICE — NDL BX BONE MARROW 11GA 4"

## (undated) DEVICE — DRAPE C-ARM 60X42" 1013

## (undated) DEVICE — CAST PLASTER SPLINT XTRA FAST 5X30" 7392

## (undated) DEVICE — BLADE KNIFE SURG 10 371110

## (undated) DEVICE — CAST BUCKET

## (undated) DEVICE — SU ETHILON 4-0 PS-2 18" BLACK 1667H

## (undated) DEVICE — GLOVE PROTEXIS W/NEU-THERA 7.5  2D73TE75

## (undated) DEVICE — DRAPE COVER C-ARM SEAMLESS SNAP-KAP 03-KP26 LATEX FREE

## (undated) DEVICE — SU MONOCRYL 3-0 PS-2 27" Y427H

## (undated) DEVICE — SOL NACL 0.9% IRRIG 1000ML BOTTLE 2F7124

## (undated) DEVICE — SU MONOCRYL 2-0 SH 27" UND Y417H

## (undated) DEVICE — DRSG TELFA ISLAND 4X10"

## (undated) DEVICE — LINEN TOWEL PACK X10 5473

## (undated) DEVICE — LINEN ORTHO ACL PACK 5447

## (undated) DEVICE — SUCTION CANISTER MEDIVAC LINER 3000ML W/LID 65651-530

## (undated) DEVICE — SPONGE RAY-TEC 4X8" 7318

## (undated) RX ORDER — OXYCODONE HYDROCHLORIDE 5 MG/1
TABLET ORAL
Status: DISPENSED
Start: 2018-05-18

## (undated) RX ORDER — IBUPROFEN 600 MG/1
TABLET, FILM COATED ORAL
Status: DISPENSED
Start: 2018-05-18

## (undated) RX ORDER — FENTANYL CITRATE 50 UG/ML
INJECTION, SOLUTION INTRAMUSCULAR; INTRAVENOUS
Status: DISPENSED
Start: 2018-05-18

## (undated) RX ORDER — LIDOCAINE HYDROCHLORIDE 10 MG/ML
INJECTION, SOLUTION EPIDURAL; INFILTRATION; INTRACAUDAL; PERINEURAL
Status: DISPENSED
Start: 2018-05-18

## (undated) RX ORDER — CEFAZOLIN SODIUM 1 G/50ML
SOLUTION INTRAVENOUS
Status: DISPENSED
Start: 2018-05-18